# Patient Record
Sex: FEMALE | Race: WHITE | NOT HISPANIC OR LATINO
[De-identification: names, ages, dates, MRNs, and addresses within clinical notes are randomized per-mention and may not be internally consistent; named-entity substitution may affect disease eponyms.]

---

## 2018-09-20 ENCOUNTER — APPOINTMENT (OUTPATIENT)
Dept: HEART AND VASCULAR | Facility: CLINIC | Age: 83
End: 2018-09-20
Payer: MEDICARE

## 2018-09-20 VITALS — BODY MASS INDEX: 29.45 KG/M2 | WEIGHT: 150 LBS | HEIGHT: 60 IN

## 2018-09-20 DIAGNOSIS — R09.89 OTHER SPECIFIED SYMPTOMS AND SIGNS INVOLVING THE CIRCULATORY AND RESPIRATORY SYSTEMS: ICD-10-CM

## 2018-09-20 DIAGNOSIS — Z87.448 PERSONAL HISTORY OF OTHER DISEASES OF URINARY SYSTEM: ICD-10-CM

## 2018-09-20 DIAGNOSIS — M54.5 LOW BACK PAIN: ICD-10-CM

## 2018-09-20 PROCEDURE — 93000 ELECTROCARDIOGRAM COMPLETE: CPT

## 2018-09-20 PROCEDURE — 99214 OFFICE O/P EST MOD 30 MIN: CPT

## 2018-09-20 RX ORDER — METOPROLOL SUCCINATE 25 MG/1
25 TABLET, EXTENDED RELEASE ORAL
Refills: 0 | Status: ACTIVE | COMMUNITY

## 2018-09-20 RX ORDER — SPIRONOLACTONE 25 MG/1
25 TABLET ORAL
Refills: 0 | Status: ACTIVE | COMMUNITY

## 2018-09-21 ENCOUNTER — APPOINTMENT (OUTPATIENT)
Dept: HEART AND VASCULAR | Facility: CLINIC | Age: 83
End: 2018-09-21
Payer: MEDICARE

## 2018-09-21 DIAGNOSIS — Z45.018 ENCOUNTER FOR ADJUSTMENT AND MANAGEMENT OF OTHER PART OF CARDIAC PACEMAKER: ICD-10-CM

## 2018-09-21 PROCEDURE — 99212 OFFICE O/P EST SF 10 MIN: CPT

## 2018-12-26 ENCOUNTER — APPOINTMENT (OUTPATIENT)
Dept: HEART AND VASCULAR | Facility: CLINIC | Age: 83
End: 2018-12-26
Payer: MEDICARE

## 2018-12-26 VITALS
BODY MASS INDEX: 30.63 KG/M2 | RESPIRATION RATE: 16 BRPM | HEART RATE: 72 BPM | SYSTOLIC BLOOD PRESSURE: 130 MMHG | DIASTOLIC BLOOD PRESSURE: 80 MMHG | WEIGHT: 156 LBS | HEIGHT: 60 IN

## 2018-12-26 PROCEDURE — 93000 ELECTROCARDIOGRAM COMPLETE: CPT

## 2018-12-26 PROCEDURE — 99215 OFFICE O/P EST HI 40 MIN: CPT

## 2018-12-26 NOTE — ASSESSMENT
[FreeTextEntry1] : EKG:  Sinus rhythm, No change from prior EKG.\par Patient very concerned about issues of SIDDIQUI.  No evidence of volume overload on exam.  BP well controlled.

## 2018-12-26 NOTE — REVIEW OF SYSTEMS
[Dyspnea on exertion] : dyspnea during exertion [Negative] : Heme/Lymph [Shortness Of Breath] : no shortness of breath [Chest Pain] : no chest pain [Lower Ext Edema] : no extremity edema [Palpitations] : no palpitations

## 2018-12-26 NOTE — HISTORY OF PRESENT ILLNESS
[FreeTextEntry1] : Patient complains of continued dyspnea upon exertion.  Can't walk more than 1-2 blocks without issue.   She denies any PND or orthopnea. She denies any chest pain. He is compliant with her medical regimen and low-sodium diet.

## 2018-12-26 NOTE — DISCUSSION/SUMMARY
[FreeTextEntry1] : Will check SMA7, CBC & pro BNP and adjust meds accordingly.  Maintain on low sodium diet.  Further recommendations after reviewing blood work.

## 2018-12-26 NOTE — PHYSICAL EXAM
[General Appearance - Well Developed] : well developed [Normal Appearance] : normal appearance [Well Groomed] : well groomed [General Appearance - Well Nourished] : well nourished [No Deformities] : no deformities [General Appearance - In No Acute Distress] : no acute distress [Normal Conjunctiva] : the conjunctiva exhibited no abnormalities [Eyelids - No Xanthelasma] : the eyelids demonstrated no xanthelasmas [Normal Oral Mucosa] : normal oral mucosa [No Oral Pallor] : no oral pallor [No Oral Cyanosis] : no oral cyanosis [Normal Jugular Venous V Waves Present] : normal jugular venous V waves present [Respiration, Rhythm And Depth] : normal respiratory rhythm and effort [Exaggerated Use Of Accessory Muscles For Inspiration] : no accessory muscle use [Auscultation Breath Sounds / Voice Sounds] : lungs were clear to auscultation bilaterally [Heart Rate And Rhythm] : heart rate and rhythm were normal [Heart Sounds] : normal S1 and S2 [Abdomen Soft] : soft [Abdomen Tenderness] : non-tender [Abdomen Mass (___ Cm)] : no abdominal mass palpated [Abnormal Walk] : normal gait [Gait - Sufficient For Exercise Testing] : the gait was sufficient for exercise testing [Nail Clubbing] : no clubbing of the fingernails [Cyanosis, Localized] : no localized cyanosis [Petechial Hemorrhages (___cm)] : no petechial hemorrhages [] : no ischemic changes [FreeTextEntry1] : apical systolic murmur

## 2018-12-27 LAB
ANION GAP SERPL CALC-SCNC: 11 MMOL/L
BASOPHILS # BLD AUTO: 0.03 K/UL
BASOPHILS NFR BLD AUTO: 0.4 %
BUN SERPL-MCNC: 16 MG/DL
CALCIUM SERPL-MCNC: 9.2 MG/DL
CHLORIDE SERPL-SCNC: 105 MMOL/L
CO2 SERPL-SCNC: 26 MMOL/L
CREAT SERPL-MCNC: 0.96 MG/DL
EOSINOPHIL # BLD AUTO: 0.11 K/UL
EOSINOPHIL NFR BLD AUTO: 1.4 %
GLUCOSE SERPL-MCNC: 70 MG/DL
HCT VFR BLD CALC: 39.8 %
HGB BLD-MCNC: 12.8 G/DL
IMM GRANULOCYTES NFR BLD AUTO: 0.1 %
LYMPHOCYTES # BLD AUTO: 1.41 K/UL
LYMPHOCYTES NFR BLD AUTO: 17.7 %
MAN DIFF?: NORMAL
MCHC RBC-ENTMCNC: 31.8 PG
MCHC RBC-ENTMCNC: 32.2 GM/DL
MCV RBC AUTO: 99 FL
MONOCYTES # BLD AUTO: 0.4 K/UL
MONOCYTES NFR BLD AUTO: 5 %
NEUTROPHILS # BLD AUTO: 6.01 K/UL
NEUTROPHILS NFR BLD AUTO: 75.4 %
NT-PROBNP SERPL-MCNC: 1250 PG/ML
PLATELET # BLD AUTO: 340 K/UL
POTASSIUM SERPL-SCNC: 5 MMOL/L
RBC # BLD: 4.02 M/UL
RBC # FLD: 13.8 %
SODIUM SERPL-SCNC: 142 MMOL/L
WBC # FLD AUTO: 7.97 K/UL

## 2018-12-28 ENCOUNTER — RX RENEWAL (OUTPATIENT)
Age: 83
End: 2018-12-28

## 2019-02-07 ENCOUNTER — APPOINTMENT (OUTPATIENT)
Dept: HEART AND VASCULAR | Facility: CLINIC | Age: 84
End: 2019-02-07
Payer: MEDICARE

## 2019-02-07 VITALS
RESPIRATION RATE: 15 BRPM | HEART RATE: 78 BPM | BODY MASS INDEX: 29.84 KG/M2 | WEIGHT: 152 LBS | SYSTOLIC BLOOD PRESSURE: 130 MMHG | HEIGHT: 60 IN | DIASTOLIC BLOOD PRESSURE: 70 MMHG

## 2019-02-07 PROCEDURE — 99214 OFFICE O/P EST MOD 30 MIN: CPT

## 2019-02-07 PROCEDURE — 93000 ELECTROCARDIOGRAM COMPLETE: CPT

## 2019-02-07 NOTE — ASSESSMENT
[FreeTextEntry1] : EKG:  Sinus rhythm, No change from prior EKG.\par Hemodynamically stable.  No signs of CHF on exam.  BP well controlled\par Remains dyspneic despite increase in diuretic Rx.\par

## 2019-02-07 NOTE — HISTORY OF PRESENT ILLNESS
[FreeTextEntry1] : Continued SIDDIQUI.  No improvement since last visit despite adjustment in diuretic Rx.  Has UTI with frequent urination.  Being Rxed with Cipro.  Denies PND or orthopnea.  Denies chest pain.

## 2019-02-07 NOTE — REVIEW OF SYSTEMS
[Dyspnea on exertion] : dyspnea during exertion [Lower Ext Edema] : lower extremity edema [Dysuria] : dysuria [Negative] : Heme/Lymph [Shortness Of Breath] : no shortness of breath [Chest Pain] : no chest pain [Palpitations] : no palpitations [Pelvic Pain] : no pelvic pain [Dysmenorrhea] : no dysmenorrhea [Vaginal Discharge] : no vaginal discharge

## 2019-02-07 NOTE — REASON FOR VISIT
[Prosthetic Valve] : a prosthetic valve [Follow-Up - Clinic] : a clinic follow-up of [Dyspnea] : dyspnea [FreeTextEntry1] : edema

## 2019-02-07 NOTE — DISCUSSION/SUMMARY
[FreeTextEntry1] : Will check SMA7 & pro-BNP and adjust meds accordingly.  Low sodium diet reinforced.  Further recommendations after reviewing blood work

## 2019-02-11 LAB
ALBUMIN SERPL ELPH-MCNC: 4.2 G/DL
ALP BLD-CCNC: 93 U/L
ALT SERPL-CCNC: 11 U/L
ANION GAP SERPL CALC-SCNC: 17 MMOL/L
AST SERPL-CCNC: 21 U/L
BASOPHILS # BLD AUTO: 0.03 K/UL
BASOPHILS NFR BLD AUTO: 0.3 %
BILIRUB SERPL-MCNC: 0.5 MG/DL
BUN SERPL-MCNC: 26 MG/DL
CALCIUM SERPL-MCNC: 9.4 MG/DL
CHLORIDE SERPL-SCNC: 98 MMOL/L
CO2 SERPL-SCNC: 29 MMOL/L
CREAT SERPL-MCNC: 1.25 MG/DL
EOSINOPHIL # BLD AUTO: 0.05 K/UL
EOSINOPHIL NFR BLD AUTO: 0.5 %
GLUCOSE SERPL-MCNC: 120 MG/DL
HCT VFR BLD CALC: 42 %
HGB BLD-MCNC: 13.5 G/DL
IMM GRANULOCYTES NFR BLD AUTO: 0.2 %
LYMPHOCYTES # BLD AUTO: 1.34 K/UL
LYMPHOCYTES NFR BLD AUTO: 12.3 %
MAN DIFF?: NORMAL
MCHC RBC-ENTMCNC: 32.1 GM/DL
MCHC RBC-ENTMCNC: 32.1 PG
MCV RBC AUTO: 99.8 FL
MONOCYTES # BLD AUTO: 0.87 K/UL
MONOCYTES NFR BLD AUTO: 8 %
NEUTROPHILS # BLD AUTO: 8.6 K/UL
NEUTROPHILS NFR BLD AUTO: 78.7 %
NT-PROBNP SERPL-MCNC: 481 PG/ML
PLATELET # BLD AUTO: 339 K/UL
POTASSIUM SERPL-SCNC: 3.9 MMOL/L
PROT SERPL-MCNC: 7.4 G/DL
RBC # BLD: 4.21 M/UL
RBC # FLD: 13.4 %
SODIUM SERPL-SCNC: 144 MMOL/L
WBC # FLD AUTO: 10.91 K/UL

## 2019-02-14 ENCOUNTER — RX RENEWAL (OUTPATIENT)
Age: 84
End: 2019-02-14

## 2019-03-27 ENCOUNTER — APPOINTMENT (OUTPATIENT)
Dept: HEART AND VASCULAR | Facility: CLINIC | Age: 84
End: 2019-03-27
Payer: MEDICARE

## 2019-03-27 VITALS
WEIGHT: 152 LBS | HEART RATE: 66 BPM | BODY MASS INDEX: 29.84 KG/M2 | DIASTOLIC BLOOD PRESSURE: 70 MMHG | RESPIRATION RATE: 16 BRPM | HEIGHT: 60 IN | SYSTOLIC BLOOD PRESSURE: 120 MMHG

## 2019-03-27 DIAGNOSIS — E03.9 HYPOTHYROIDISM, UNSPECIFIED: ICD-10-CM

## 2019-03-27 DIAGNOSIS — E78.5 HYPERLIPIDEMIA, UNSPECIFIED: ICD-10-CM

## 2019-03-27 PROCEDURE — 99214 OFFICE O/P EST MOD 30 MIN: CPT

## 2019-03-27 PROCEDURE — 93000 ELECTROCARDIOGRAM COMPLETE: CPT

## 2019-03-27 NOTE — REVIEW OF SYSTEMS
[Blurry Vision] : no blurred vision [Eyeglasses] : currently wearing eyeglasses [Shortness Of Breath] : no shortness of breath [Dyspnea on exertion] : dyspnea during exertion [Chest Pain] : no chest pain [Lower Ext Edema] : lower extremity edema [Palpitations] : no palpitations [Dysuria] : dysuria [Pelvic Pain] : no pelvic pain [Dysmenorrhea] : no dysmenorrhea [Vaginal Discharge] : no vaginal discharge [Negative] : Heme/Lymph

## 2019-03-27 NOTE — HISTORY OF PRESENT ILLNESS
[FreeTextEntry1] : Patient denies chest pain, palpitation, PND or orthopnea.  Has continued SIDDIQUI.  No improvement.  Patient did  not add nitrates as instructed after the last visit.  Was unsure if the instructions were coming directly from me.\par

## 2019-03-27 NOTE — DISCUSSION/SUMMARY
[FreeTextEntry1] : Will check blood work & adjust meds after reviewing.   Ambulate as tolerated.  Maintain low sodium, low caloric, low cholesterol diet.\par

## 2019-03-28 LAB
ANION GAP SERPL CALC-SCNC: 14 MMOL/L
BUN SERPL-MCNC: 23 MG/DL
CALCIUM SERPL-MCNC: 8.9 MG/DL
CHLORIDE SERPL-SCNC: 101 MMOL/L
CO2 SERPL-SCNC: 28 MMOL/L
CREAT SERPL-MCNC: 1 MG/DL
GLUCOSE SERPL-MCNC: 79 MG/DL
NT-PROBNP SERPL-MCNC: 833 PG/ML
POTASSIUM SERPL-SCNC: 4.1 MMOL/L
SODIUM SERPL-SCNC: 143 MMOL/L

## 2019-04-22 ENCOUNTER — APPOINTMENT (OUTPATIENT)
Dept: HEART AND VASCULAR | Facility: CLINIC | Age: 84
End: 2019-04-22
Payer: MEDICARE

## 2019-04-22 VITALS
HEIGHT: 60 IN | SYSTOLIC BLOOD PRESSURE: 110 MMHG | BODY MASS INDEX: 29.64 KG/M2 | DIASTOLIC BLOOD PRESSURE: 70 MMHG | WEIGHT: 151 LBS | HEART RATE: 72 BPM | RESPIRATION RATE: 16 BRPM

## 2019-04-22 PROCEDURE — 99214 OFFICE O/P EST MOD 30 MIN: CPT

## 2019-04-22 PROCEDURE — 93000 ELECTROCARDIOGRAM COMPLETE: CPT

## 2019-04-22 NOTE — PHYSICAL EXAM
[Normal Appearance] : normal appearance [General Appearance - Well Developed] : well developed [Well Groomed] : well groomed [No Deformities] : no deformities [General Appearance - Well Nourished] : well nourished [General Appearance - In No Acute Distress] : no acute distress [Normal Conjunctiva] : the conjunctiva exhibited no abnormalities [Eyelids - No Xanthelasma] : the eyelids demonstrated no xanthelasmas [Normal Oral Mucosa] : normal oral mucosa [No Oral Pallor] : no oral pallor [No Oral Cyanosis] : no oral cyanosis [Normal Jugular Venous V Waves Present] : normal jugular venous V waves present [Heart Rate And Rhythm] : heart rate and rhythm were normal [Heart Sounds] : normal S1 and S2 [Respiration, Rhythm And Depth] : normal respiratory rhythm and effort [Exaggerated Use Of Accessory Muscles For Inspiration] : no accessory muscle use [Abdomen Soft] : soft [Auscultation Breath Sounds / Voice Sounds] : lungs were clear to auscultation bilaterally [Abdomen Tenderness] : non-tender [Abdomen Mass (___ Cm)] : no abdominal mass palpated [Gait - Sufficient For Exercise Testing] : the gait was sufficient for exercise testing [Abnormal Walk] : normal gait [Cyanosis, Localized] : no localized cyanosis [Nail Clubbing] : no clubbing of the fingernails [Petechial Hemorrhages (___cm)] : no petechial hemorrhages [] : no ischemic changes [FreeTextEntry1] : apical systolic murmur

## 2019-04-22 NOTE — HISTORY OF PRESENT ILLNESS
[FreeTextEntry1] : The patient states that her dyspnea somewhat improved. She is taking the isosorbide as prescribed, however, she has a and nausea after she takes it.  She takes the evening dose 2 hours before bedtime.  She denies any chest pain or palpitation. She denies any PND or orthopnea. She is compliant with them dietary measures.

## 2019-06-04 ENCOUNTER — APPOINTMENT (OUTPATIENT)
Dept: HEART AND VASCULAR | Facility: CLINIC | Age: 84
End: 2019-06-04
Payer: MEDICARE

## 2019-06-04 VITALS
RESPIRATION RATE: 16 BRPM | HEART RATE: 66 BPM | BODY MASS INDEX: 29.45 KG/M2 | WEIGHT: 156 LBS | SYSTOLIC BLOOD PRESSURE: 130 MMHG | DIASTOLIC BLOOD PRESSURE: 80 MMHG | HEIGHT: 61 IN

## 2019-06-04 DIAGNOSIS — I70.8 ATHEROSCLEROSIS OF OTHER ARTERIES: ICD-10-CM

## 2019-06-04 PROCEDURE — 93288 INTERROG EVL PM/LDLS PM IP: CPT

## 2019-06-04 PROCEDURE — 99214 OFFICE O/P EST MOD 30 MIN: CPT

## 2019-06-05 NOTE — PROCEDURE
[Pacemaker] : pacemaker [DDD] : DDD [Longevity: ___ months] : The estimated remaining battery life is [unfilled] months [de-identified] : medtronic

## 2019-06-05 NOTE — REASON FOR VISIT
[Follow-Up - Clinic] : a clinic follow-up of [Dyspnea] : dyspnea [Follow-up Device Check] : follow-up device check visit

## 2019-06-05 NOTE — PROCEDURE
[Pacemaker] : pacemaker [DDD] : DDD [Longevity: ___ months] : The estimated remaining battery life is [unfilled] months [de-identified] : medtronic

## 2019-06-05 NOTE — REASON FOR VISIT
[Dyspnea] : dyspnea [Follow-Up - Clinic] : a clinic follow-up of [Follow-up Device Check] : follow-up device check visit

## 2019-06-06 NOTE — DISCUSSION/SUMMARY
[Pacemaker Function Normal] : normal pacemaker function [FreeTextEntry1] : Maintain on current medical regimen.  Ambulate as tolerated.  Maintain low sodium, low cholesterol,  diabetic diet. Weight loss encouraged.  Regular PPM interrogations.  The above was discussed with the patient with a Bangladeshi  present throughout the consultation.\par \par

## 2019-06-06 NOTE — DISCUSSION/SUMMARY
[Pacemaker Function Normal] : normal pacemaker function [FreeTextEntry1] : Maintain on current medical regimen.  Ambulate as tolerated.  Maintain low sodium, low cholesterol,  diabetic diet. Weight loss encouraged.  Regular PPM interrogations.  The above was discussed with the patient with a Austrian  present throughout the consultation.\par \par

## 2019-06-06 NOTE — PHYSICAL EXAM
[Normal Appearance] : normal appearance [General Appearance - Well Developed] : well developed [Well Groomed] : well groomed [General Appearance - In No Acute Distress] : no acute distress [General Appearance - Well Nourished] : well nourished [No Deformities] : no deformities [Normal Conjunctiva] : the conjunctiva exhibited no abnormalities [Eyelids - No Xanthelasma] : the eyelids demonstrated no xanthelasmas [Normal Oral Mucosa] : normal oral mucosa [No Oral Cyanosis] : no oral cyanosis [No Oral Pallor] : no oral pallor [Normal Jugular Venous V Waves Present] : normal jugular venous V waves present [Exaggerated Use Of Accessory Muscles For Inspiration] : no accessory muscle use [Respiration, Rhythm And Depth] : normal respiratory rhythm and effort [Heart Rate And Rhythm] : heart rate and rhythm were normal [Auscultation Breath Sounds / Voice Sounds] : lungs were clear to auscultation bilaterally [Heart Sounds] : normal S1 and S2 [Abdomen Tenderness] : non-tender [Abdomen Soft] : soft [Abdomen Mass (___ Cm)] : no abdominal mass palpated [Abnormal Walk] : normal gait [Nail Clubbing] : no clubbing of the fingernails [Gait - Sufficient For Exercise Testing] : the gait was sufficient for exercise testing [Petechial Hemorrhages (___cm)] : no petechial hemorrhages [] : no ischemic changes [Cyanosis, Localized] : no localized cyanosis [FreeTextEntry1] : apical systolic murmur

## 2019-06-06 NOTE — HISTORY OF PRESENT ILLNESS
[FreeTextEntry1] : The patient states that she is able to walk any further distance now. She has somewhat less dyspnea upon exertion. She is tolerating isosorbide better since the adjustment was made and the dose and her dosing regimen last visit. She denies any PND orthopnea. She denies any chest pain.

## 2019-06-06 NOTE — PHYSICAL EXAM
[General Appearance - Well Developed] : well developed [Well Groomed] : well groomed [Normal Appearance] : normal appearance [No Deformities] : no deformities [General Appearance - In No Acute Distress] : no acute distress [General Appearance - Well Nourished] : well nourished [Normal Conjunctiva] : the conjunctiva exhibited no abnormalities [Eyelids - No Xanthelasma] : the eyelids demonstrated no xanthelasmas [Normal Oral Mucosa] : normal oral mucosa [No Oral Pallor] : no oral pallor [No Oral Cyanosis] : no oral cyanosis [Normal Jugular Venous V Waves Present] : normal jugular venous V waves present [Respiration, Rhythm And Depth] : normal respiratory rhythm and effort [Exaggerated Use Of Accessory Muscles For Inspiration] : no accessory muscle use [Heart Sounds] : normal S1 and S2 [Auscultation Breath Sounds / Voice Sounds] : lungs were clear to auscultation bilaterally [Heart Rate And Rhythm] : heart rate and rhythm were normal [Abdomen Soft] : soft [Abdomen Tenderness] : non-tender [Abdomen Mass (___ Cm)] : no abdominal mass palpated [Abnormal Walk] : normal gait [Gait - Sufficient For Exercise Testing] : the gait was sufficient for exercise testing [Nail Clubbing] : no clubbing of the fingernails [Cyanosis, Localized] : no localized cyanosis [Petechial Hemorrhages (___cm)] : no petechial hemorrhages [] : no ischemic changes [FreeTextEntry1] : apical systolic murmur

## 2019-07-17 ENCOUNTER — APPOINTMENT (OUTPATIENT)
Dept: HEART AND VASCULAR | Facility: CLINIC | Age: 84
End: 2019-07-17
Payer: MEDICARE

## 2019-07-17 VITALS
SYSTOLIC BLOOD PRESSURE: 130 MMHG | HEART RATE: 66 BPM | WEIGHT: 154 LBS | BODY MASS INDEX: 30.23 KG/M2 | DIASTOLIC BLOOD PRESSURE: 80 MMHG | RESPIRATION RATE: 15 BRPM | HEIGHT: 60 IN

## 2019-07-17 PROCEDURE — 93000 ELECTROCARDIOGRAM COMPLETE: CPT

## 2019-07-17 PROCEDURE — 99214 OFFICE O/P EST MOD 30 MIN: CPT

## 2019-07-17 NOTE — REASON FOR VISIT
[Follow-Up - Clinic] : a clinic follow-up of [Dyspnea] : dyspnea [Fatigue] : feeling tired (fatigue) [Family Member] : family member

## 2019-07-17 NOTE — DISCUSSION/SUMMARY
[FreeTextEntry1] : Maintain on current medical regimen for now.  Will reassess when heat & humidity abates.  Ambulate as tolerated.  Maintain low sodium, low cholesterol diet.\par

## 2019-07-17 NOTE — ASSESSMENT
[FreeTextEntry1] : EKG: Fully paced\par Hemodynamically stable.  No signs of CHF on exam.  BP well controlled\par

## 2019-07-17 NOTE — REVIEW OF SYSTEMS
[Eyeglasses] : currently wearing eyeglasses [Dyspnea on exertion] : dyspnea during exertion [Dysuria] : dysuria [Negative] : Heme/Lymph [Blurry Vision] : no blurred vision [Shortness Of Breath] : no shortness of breath [Chest Pain] : no chest pain [Lower Ext Edema] : no extremity edema [Palpitations] : no palpitations [Pelvic Pain] : no pelvic pain [Dysmenorrhea] : no dysmenorrhea [Vaginal Discharge] : no vaginal discharge

## 2019-07-17 NOTE — HISTORY OF PRESENT ILLNESS
[FreeTextEntry1] : Patient denies chest pain, palpitation, PND or orthopnea.  Has stable SIDDIQUI when ambulating outdoors despite addition of nitrates but very hot & humid outside now.  No dyspnea when at home.  No LE edema.\par

## 2019-10-30 ENCOUNTER — NON-APPOINTMENT (OUTPATIENT)
Age: 84
End: 2019-10-30

## 2019-10-30 ENCOUNTER — APPOINTMENT (OUTPATIENT)
Dept: HEART AND VASCULAR | Facility: CLINIC | Age: 84
End: 2019-10-30
Payer: MEDICARE

## 2019-10-30 VITALS
SYSTOLIC BLOOD PRESSURE: 130 MMHG | RESPIRATION RATE: 15 BRPM | WEIGHT: 156 LBS | HEIGHT: 60 IN | HEART RATE: 60 BPM | DIASTOLIC BLOOD PRESSURE: 70 MMHG | BODY MASS INDEX: 30.63 KG/M2

## 2019-10-30 PROCEDURE — 93000 ELECTROCARDIOGRAM COMPLETE: CPT | Mod: 59

## 2019-10-30 PROCEDURE — 99214 OFFICE O/P EST MOD 30 MIN: CPT

## 2019-10-30 PROCEDURE — 93288 INTERROG EVL PM/LDLS PM IP: CPT

## 2019-10-30 NOTE — PROCEDURE
[Pacemaker] : pacemaker [DDD] : DDD [Longevity: ___ months] : The estimated remaining battery life is [unfilled] months [de-identified] : medtronic

## 2019-10-30 NOTE — ASSESSMENT
[FreeTextEntry1] : EKG:  appropriate pacing\par Hemodynamically stable.  No signs of CHF on exam.  BP well controlled\par

## 2019-10-30 NOTE — PHYSICAL EXAM
[General Appearance - Well Developed] : well developed [Normal Appearance] : normal appearance [Well Groomed] : well groomed [General Appearance - Well Nourished] : well nourished [No Deformities] : no deformities [General Appearance - In No Acute Distress] : no acute distress [Normal Conjunctiva] : the conjunctiva exhibited no abnormalities [Eyelids - No Xanthelasma] : the eyelids demonstrated no xanthelasmas [Normal Oral Mucosa] : normal oral mucosa [No Oral Pallor] : no oral pallor [No Oral Cyanosis] : no oral cyanosis [Normal Jugular Venous V Waves Present] : normal jugular venous V waves present [Heart Rate And Rhythm] : heart rate and rhythm were normal [Heart Sounds] : normal S1 and S2 [Respiration, Rhythm And Depth] : normal respiratory rhythm and effort [Exaggerated Use Of Accessory Muscles For Inspiration] : no accessory muscle use [Auscultation Breath Sounds / Voice Sounds] : lungs were clear to auscultation bilaterally [Abdomen Soft] : soft [Abdomen Tenderness] : non-tender [Abdomen Mass (___ Cm)] : no abdominal mass palpated [Abnormal Walk] : normal gait [Gait - Sufficient For Exercise Testing] : the gait was sufficient for exercise testing [Nail Clubbing] : no clubbing of the fingernails [Cyanosis, Localized] : no localized cyanosis [Petechial Hemorrhages (___cm)] : no petechial hemorrhages [] : no ischemic changes [FreeTextEntry1] : apical systolic murmur

## 2019-10-30 NOTE — DISCUSSION/SUMMARY
[FreeTextEntry1] : Maintain on current medical regimen.  Ambulate as tolerated.  Maintain low sodium,  low cholesterol,  diabetic diet.\par Regular PPM interrogations [Pacemaker Function Normal] : normal pacemaker function

## 2019-10-30 NOTE — PROCEDURE
[Pacemaker] : pacemaker [DDD] : DDD [Longevity: ___ months] : The estimated remaining battery life is [unfilled] months [de-identified] : medtronic

## 2020-01-29 ENCOUNTER — APPOINTMENT (OUTPATIENT)
Dept: HEART AND VASCULAR | Facility: CLINIC | Age: 85
End: 2020-01-29
Payer: MEDICARE

## 2020-01-29 ENCOUNTER — NON-APPOINTMENT (OUTPATIENT)
Age: 85
End: 2020-01-29

## 2020-01-29 VITALS — WEIGHT: 153 LBS | BODY MASS INDEX: 30.04 KG/M2 | HEIGHT: 60 IN | HEART RATE: 63 BPM

## 2020-01-29 PROCEDURE — 93000 ELECTROCARDIOGRAM COMPLETE: CPT

## 2020-01-29 PROCEDURE — 99215 OFFICE O/P EST HI 40 MIN: CPT

## 2020-01-29 RX ORDER — ISOSORBIDE DINITRATE 10 MG/1
10 TABLET ORAL
Qty: 30 | Refills: 5 | Status: DISCONTINUED | COMMUNITY
Start: 2019-02-14 | End: 2020-01-29

## 2020-01-29 NOTE — ASSESSMENT
[FreeTextEntry1] : EKG:  Sinus rhythm, APBs, No change from prior EKG.\par Echo:  Normal LV function, moderate  MR\par Hemodynamically stable.  No signs of CHF on exam.  BP well controlled\par Still with SIDDIQUI\par \par

## 2020-01-29 NOTE — REVIEW OF SYSTEMS
[Eyeglasses] : currently wearing eyeglasses [Dyspnea on exertion] : dyspnea during exertion [Dysuria] : dysuria [Negative] : Endocrine [Blurry Vision] : no blurred vision [Shortness Of Breath] : no shortness of breath [Chest Pain] : no chest pain [Lower Ext Edema] : no extremity edema [Palpitations] : no palpitations [Pelvic Pain] : no pelvic pain [Dysmenorrhea] : no dysmenorrhea [Vaginal Discharge] : no vaginal discharge

## 2020-01-29 NOTE — REASON FOR VISIT
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

## 2020-01-29 NOTE — HISTORY OF PRESENT ILLNESS
[FreeTextEntry1] : SIDDIQUI somewhat worse.  No chest pain or palpitation.  no PND or orthopnea.  Stopped Isosorbide as it was not helping her dyspnea.  Taking extra Torsemide only BIW, not TIW as prescribed.

## 2020-01-29 NOTE — PHYSICAL EXAM
[Normal Appearance] : normal appearance [General Appearance - Well Developed] : well developed [Well Groomed] : well groomed [General Appearance - Well Nourished] : well nourished [No Deformities] : no deformities [Normal Conjunctiva] : the conjunctiva exhibited no abnormalities [General Appearance - In No Acute Distress] : no acute distress [Eyelids - No Xanthelasma] : the eyelids demonstrated no xanthelasmas [No Oral Pallor] : no oral pallor [Normal Oral Mucosa] : normal oral mucosa [Normal Jugular Venous V Waves Present] : normal jugular venous V waves present [No Oral Cyanosis] : no oral cyanosis [Respiration, Rhythm And Depth] : normal respiratory rhythm and effort [Exaggerated Use Of Accessory Muscles For Inspiration] : no accessory muscle use [Heart Rate And Rhythm] : heart rate and rhythm were normal [Auscultation Breath Sounds / Voice Sounds] : lungs were clear to auscultation bilaterally [Heart Sounds] : normal S1 and S2 [Abdomen Soft] : soft [Abdomen Tenderness] : non-tender [Abnormal Walk] : normal gait [Abdomen Mass (___ Cm)] : no abdominal mass palpated [Gait - Sufficient For Exercise Testing] : the gait was sufficient for exercise testing [Nail Clubbing] : no clubbing of the fingernails [] : no ischemic changes [Petechial Hemorrhages (___cm)] : no petechial hemorrhages [Cyanosis, Localized] : no localized cyanosis [FreeTextEntry1] : apical systolic murmur

## 2020-01-29 NOTE — DISCUSSION/SUMMARY
[FreeTextEntry1] : Will increase dose of diuretics.  maintain on all other meds.  Ambulate as tolerated.  Low sodium diet reinforced.  The above was discussed with the patient with a Croatian  present throughout the consultation.\par

## 2020-03-11 ENCOUNTER — NON-APPOINTMENT (OUTPATIENT)
Age: 85
End: 2020-03-11

## 2020-03-11 ENCOUNTER — APPOINTMENT (OUTPATIENT)
Dept: HEART AND VASCULAR | Facility: CLINIC | Age: 85
End: 2020-03-11
Payer: MEDICARE

## 2020-03-11 VITALS
SYSTOLIC BLOOD PRESSURE: 130 MMHG | WEIGHT: 156 LBS | BODY MASS INDEX: 30.63 KG/M2 | DIASTOLIC BLOOD PRESSURE: 70 MMHG | RESPIRATION RATE: 15 BRPM | HEART RATE: 60 BPM | HEIGHT: 60 IN

## 2020-03-11 DIAGNOSIS — I49.5 SICK SINUS SYNDROME: ICD-10-CM

## 2020-03-11 DIAGNOSIS — S22.39XA FRACTURE OF ONE RIB, UNSPECIFIED SIDE, INITIAL ENCOUNTER FOR CLOSED FRACTURE: ICD-10-CM

## 2020-03-11 DIAGNOSIS — G20 PARKINSON'S DISEASE: ICD-10-CM

## 2020-03-11 PROCEDURE — 93000 ELECTROCARDIOGRAM COMPLETE: CPT

## 2020-03-11 PROCEDURE — 99215 OFFICE O/P EST HI 40 MIN: CPT

## 2020-03-11 NOTE — REVIEW OF SYSTEMS
[Eyeglasses] : currently wearing eyeglasses [Dyspnea on exertion] : dyspnea during exertion [Dysuria] : dysuria [Negative] : Heme/Lymph [Loss Of Hearing] : hearing loss [Blurry Vision] : no blurred vision [Shortness Of Breath] : no shortness of breath [Chest Pain] : no chest pain [Lower Ext Edema] : no extremity edema [Palpitations] : no palpitations [Pelvic Pain] : no pelvic pain [Dysmenorrhea] : no dysmenorrhea [Vaginal Discharge] : no vaginal discharge

## 2020-03-11 NOTE — HISTORY OF PRESENT ILLNESS
[FreeTextEntry1] : Patient fell 2 weeks ago during the night & fractured 3 ribs right side.  Still with some pain in area.  No dizziness or LOC.  Mechanical fall.  Not walking much recently due to rib problems & not complaining of dyspnea today.  Recently diagnosed with Parkinson's disease.  Worried that Torsemide causing some hearing loss.

## 2020-03-11 NOTE — PROCEDURE
[Pacemaker] : pacemaker [DDD] : DDD [Longevity: ___ months] : The estimated remaining battery life is [unfilled] months [de-identified] : medtronic

## 2020-03-11 NOTE — PROCEDURE
[Pacemaker] : pacemaker [DDD] : DDD [Longevity: ___ months] : The estimated remaining battery life is [unfilled] months [de-identified] : medtronic

## 2020-03-11 NOTE — REASON FOR VISIT
[Follow-Up - Clinic] : a clinic follow-up of [Follow-up Device Check] : follow-up device check visit

## 2020-03-11 NOTE — DISCUSSION/SUMMARY
[FreeTextEntry1] : Maintain on current medical regimen.  Ambulate as tolerated.  Maintain low sodium, low caloric, low cholesterol diet.  Get hearing test to determine cause of hearing loss. Regular PPM interrogations.  The above was discussed with the patient with a Dutch  present throughout the consultation.\par \par

## 2020-03-11 NOTE — DISCUSSION/SUMMARY
[FreeTextEntry1] : Maintain on current medical regimen.  Ambulate as tolerated.  Maintain low sodium, low caloric, low cholesterol diet.  Get hearing test to determine cause of hearing loss. Regular PPM interrogations.  The above was discussed with the patient with a Czech  present throughout the consultation.\par \par

## 2020-03-11 NOTE — ASSESSMENT
[FreeTextEntry1] : EKG:  appropriate pacing.\par Hemodynamically stable.  No signs of CHF on exam.  BP well controlled\par

## 2020-06-07 ENCOUNTER — RX RENEWAL (OUTPATIENT)
Age: 85
End: 2020-06-07

## 2020-07-15 ENCOUNTER — NON-APPOINTMENT (OUTPATIENT)
Age: 85
End: 2020-07-15

## 2020-07-15 ENCOUNTER — APPOINTMENT (OUTPATIENT)
Dept: HEART AND VASCULAR | Facility: CLINIC | Age: 85
End: 2020-07-15
Payer: MEDICARE

## 2020-07-15 VITALS
WEIGHT: 146 LBS | HEART RATE: 60 BPM | SYSTOLIC BLOOD PRESSURE: 130 MMHG | BODY MASS INDEX: 28.66 KG/M2 | DIASTOLIC BLOOD PRESSURE: 80 MMHG | RESPIRATION RATE: 15 BRPM | HEIGHT: 60 IN

## 2020-07-15 DIAGNOSIS — Z98.890 OTHER SPECIFIED POSTPROCEDURAL STATES: ICD-10-CM

## 2020-07-15 DIAGNOSIS — R06.02 SHORTNESS OF BREATH: ICD-10-CM

## 2020-07-15 DIAGNOSIS — Z95.0 PRESENCE OF CARDIAC PACEMAKER: ICD-10-CM

## 2020-07-15 DIAGNOSIS — R07.9 CHEST PAIN, UNSPECIFIED: ICD-10-CM

## 2020-07-15 PROCEDURE — 93000 ELECTROCARDIOGRAM COMPLETE: CPT

## 2020-07-15 PROCEDURE — 99215 OFFICE O/P EST HI 40 MIN: CPT

## 2020-07-15 RX ORDER — TORSEMIDE 20 MG/1
20 TABLET ORAL
Qty: 45 | Refills: 5 | Status: ACTIVE | COMMUNITY
Start: 2020-06-07 | End: 1900-01-01

## 2020-07-15 RX ORDER — AMLODIPINE BESYLATE 2.5 MG/1
2.5 TABLET ORAL DAILY
Qty: 30 | Refills: 5 | Status: ACTIVE | COMMUNITY
Start: 2020-07-15 | End: 1900-01-01

## 2020-07-15 NOTE — DISCUSSION/SUMMARY
[FreeTextEntry1] : Will add small dose of calcium channel blockers to regimen to help control dyspnea & chest discomfort.  Unable to tolerate nitrates in the past.  Maintain on all other meds for now.  Will get pharmacologic stress test to fully exclude myocardial ischemia.  Low sodium diet reinforced.\par The above was discussed with the patient with a Burundian  present throughout the consultation.\par 40 minutes spent with the patient & >50% of the time spent in the encounter involved counseling & coordination of care for any or all of the diagnoses submitted.\par

## 2020-07-15 NOTE — ASSESSMENT
[FreeTextEntry1] : EKG:  appropriate pacing\par Hemodynamically stable.  No signs of CHF on exam.  BP well controlled\par Has episodic dyspnea & chest discomfort.

## 2020-07-15 NOTE — REASON FOR VISIT
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

## 2020-07-15 NOTE — PHYSICAL EXAM
[Normal Appearance] : normal appearance [General Appearance - Well Developed] : well developed [General Appearance - Well Nourished] : well nourished [Well Groomed] : well groomed [No Deformities] : no deformities [General Appearance - In No Acute Distress] : no acute distress [Normal Conjunctiva] : the conjunctiva exhibited no abnormalities [Normal Oral Mucosa] : normal oral mucosa [Eyelids - No Xanthelasma] : the eyelids demonstrated no xanthelasmas [No Oral Cyanosis] : no oral cyanosis [No Oral Pallor] : no oral pallor [Normal Jugular Venous V Waves Present] : normal jugular venous V waves present [Exaggerated Use Of Accessory Muscles For Inspiration] : no accessory muscle use [Respiration, Rhythm And Depth] : normal respiratory rhythm and effort [Auscultation Breath Sounds / Voice Sounds] : lungs were clear to auscultation bilaterally [Heart Rate And Rhythm] : heart rate and rhythm were normal [Heart Sounds] : normal S1 and S2 [Abdomen Soft] : soft [Abdomen Tenderness] : non-tender [Abdomen Mass (___ Cm)] : no abdominal mass palpated [Nail Clubbing] : no clubbing of the fingernails [Gait - Sufficient For Exercise Testing] : the gait was sufficient for exercise testing [Abnormal Walk] : normal gait [] : no ischemic changes [Petechial Hemorrhages (___cm)] : no petechial hemorrhages [Cyanosis, Localized] : no localized cyanosis [FreeTextEntry1] : apical systolic murmur

## 2020-07-15 NOTE — HISTORY OF PRESENT ILLNESS
[FreeTextEntry1] : The patient complains of dizziness & palpitation & dyspnea when walked outside when hot & humid.  Occurred once 2 weeks ago.  No further episodes.  Normally has some dyspnea when walking.  No change recently.  Has some left anterior axillary chest pain that occurs at rest & on exertion.  Can happen 5-6 time s a day.  Lasts 10-15 minutes per episode.  Had 1 episode of " squeezing" sensation around her chest at rest.  Lasted 10 minutes and resolved.  Not using spironolactone at this time.

## 2020-07-27 ENCOUNTER — APPOINTMENT (OUTPATIENT)
Dept: HEART AND VASCULAR | Facility: CLINIC | Age: 85
End: 2020-07-27

## 2020-10-14 ENCOUNTER — APPOINTMENT (OUTPATIENT)
Dept: HEART AND VASCULAR | Facility: CLINIC | Age: 85
End: 2020-10-14

## 2021-06-30 ENCOUNTER — RX RENEWAL (OUTPATIENT)
Age: 86
End: 2021-06-30